# Patient Record
Sex: MALE
[De-identification: names, ages, dates, MRNs, and addresses within clinical notes are randomized per-mention and may not be internally consistent; named-entity substitution may affect disease eponyms.]

---

## 2019-02-14 ENCOUNTER — HOSPITAL ENCOUNTER (OUTPATIENT)
Dept: HOSPITAL 88 - OR | Age: 54
Discharge: HOME | End: 2019-02-14
Attending: INTERNAL MEDICINE
Payer: COMMERCIAL

## 2019-02-14 VITALS — SYSTOLIC BLOOD PRESSURE: 109 MMHG | DIASTOLIC BLOOD PRESSURE: 77 MMHG

## 2019-02-14 DIAGNOSIS — K64.8: ICD-10-CM

## 2019-02-14 DIAGNOSIS — Z12.11: Primary | ICD-10-CM

## 2019-02-14 DIAGNOSIS — E66.3: ICD-10-CM

## 2019-02-14 DIAGNOSIS — K57.30: ICD-10-CM

## 2019-02-14 DIAGNOSIS — F17.210: ICD-10-CM

## 2019-02-14 DIAGNOSIS — Z01.810: ICD-10-CM

## 2019-02-14 DIAGNOSIS — Z71.3: ICD-10-CM

## 2019-02-14 PROCEDURE — 93005 ELECTROCARDIOGRAM TRACING: CPT

## 2019-02-14 PROCEDURE — 45378 DIAGNOSTIC COLONOSCOPY: CPT

## 2019-02-14 NOTE — XMS REPORT
Clinical Summary

                             Created on: 2019



Gianfranco Monahan

External Reference #: [id number]

: 1965

Sex: Male



Demographics







                          Address                   [address] [place] [address]  [address]

 

                          Home Phone                [phone]

 

                          Preferred Language        Unknown

 

                          Marital Status            

 

                          [employer] Affiliation     Unknown

 

                          Race                      White

 

                          Ethnic Group              /Latin





Author







                          Author                    [facility]

 

                          Organization              [facility]

 

                          Address                   Unknown

 

                          Phone                     Unavailable







Support







                Name            Relationship    Address         Phone

 

                TABOADA,LEVI    ECON            Unknown         [phone]







Care Team Providers







                    Care Team Member Name    Role                Phone

 

                    Asked, No Pcp       PCP                 Unavailable







Allergies

No Known Allergies



Medications







                          End Date                  Status



              Medication     Sig          Dispensed     Refills      Start  



                                         Date  

 

                                                    Active



                     simvastatin (ZOCOR) 40 MG       1                     



                           tablet                    9  







Active Problems







 



                           Problem                   Noted Date

 

 



                           Internal derangement of right knee     2019

 

 



                           Effusion of right knee     2019







Encounters







                          Care Team                 Description



                     Date                Type                Specialty  

 

                                        



Renzo Cote Jr., MD           Internal derangement of right knee (Primary Dx); 

Effusion of right knee



                     2019          Office Visit        Orthopedic Surgery  

 

                                        



Lew Diane MA                         Right knee pain, unspecified chronicity (Primary Dx)



                     2019          Orders Only         Orthopedic Surgery  

 

                                        



Lew Diane MA                         Right knee pain, unspecified chronicity (Primary Dx)



                     2019          Orders Only         Sports Medicine  



after 2018



Family History







   



                 Medical History     Relation        Name            Comments

 

   



                           No Known Problems         Brother  

 

   



                           No Known Problems         Father  

 

   



                           No Known Problems         Mother  

 

   



                           No Known Problems         Sister  









   



                 Relation        Name            Status          Comments

 

   



                                         Brother   

 

   



                                         Father   

 

   



                                         Mother   

 

   



                                         Sister   







Social History







                                        Date



                 Tobacco Use     Types           Packs/Day       Years Used 

 

                                         



                                         Never Smoker    

 

    



                                         Smokeless Tobacco: Never   



                                         Used   









   



                 Alcohol Use     Drinks/Week     oz/Week         Comments

 

   



                                         No   









  



                     Alcohol Habits      Answer              Date Recorded

 

  



                     How often do you have a drink containing alcohol?     Never               2019

 

  



                           How many drinks containing alcohol do you have on     Not asked 



                                         a typical day when you are drinking?  

 

  



                           How often do you have six or more drinks on one     Not asked 



                                         occasion?  









 



                           Sex Assigned at Birth     Date Recorded

 

 



                                         Not on file 









                                        Industry



                           Job Start Date            Occupation 

 

                                        Not on file



                           Not on file               Not on file 









                                        Travel End



                           Travel History            Travel Start 

 





                                         No recent travel history available.







Last Filed Vital Signs







                                        Time Taken



                           Vital Sign                Reading 

 

                                        2019  3:26 PM CST



                           Blood Pressure            118/77 

 

                                        2019  3:26 PM CST



                           Pulse                     63 

 

                                        -



                           Temperature               - 

 

                                        -



                           Respiratory Rate          - 

 

                                        -



                           Oxygen Saturation         - 

 

                                        -



                           Inhaled Oxygen            - 



                                         Concentration  

 

                                        2019  3:26 PM CST



                           Weight                    79.4 kg (175 lb) 

 

                                        2019  3:26 PM CST



                           Height                    165.1 cm (5' 5") 

 

                                        2019  3:26 PM CST



                           Body Mass Index           29.12 







Plan of Treatment







                          Care Team                 Description



                     Date                Type                Specialty  

 

                                        



Renzo Cote Jr., MD



 [facility]



[address]



[place] [address]



[phone] (Fax)                       



                     2019          Appointment         Radiology  









   



                 Health Maintenance     Due Date        Last Done       Comments

 

   



                           COLON CANCER SCREENING     2015  

 

   



                           SHINGLES VACCINES (1 of     2015  



                                         2)   

 

   



                     INFLUENZA VACCINE     2018 







Procedures







                                        Comments



                 Procedure Name     Priority        Date/Time       Associated Diagnosis 

 

                                        



Results for this procedure are in the results section.



                 XR KNEE 4+ VW RIGHT     Routine         2019      Right knee pain, 



                           3:38 PM CST               unspecified chronicity 



after 2018



Results

* XR Knee 4+ Vw Right (2019  3:38 PM CST)





 



                           Impressions               Performed At

 

 



                           Effusion knee.Minimal joint narrowing and minimal osteophytes noted.      [facility]











 



                           Narrative                 Performed At

 

 



                           This result has an attachment that is not available.      [facility]



                                         CLINICAL: knee pain and swelling 



                                         FINDINGS: AP, lateral, tunnel, and sunrise views were obtained of the 



                                         right knee. The bones are intact and normally located. There is a moderate 



                                         effusion. The bones, joints, and soft tissues appear normal except for 



                                         mild arthritic changes noted in the compartments. 









   



                 Performing Organization     Address         City/State/Zipcode     Phone Number

 

   



                      [facility]          [address] [place] [address] 





after 2018



Insurance







     



            Payer      Benefit     Subscriber ID     Type       Phone      Address



                                         Plan /    



                                         Group    

 

     



                 [employer]BS            BCBS            xxxxxxxxxxxx     PPO  



                                         CHOICE    



                                         PPO/[employer] PPO    









     



            Guarantor Name     Account     Relation to     Date of     Phone      Billing Address



                     Type                Patient             Birth  

 

     



            Gianfranco Monahan     Personal/F     Self       1965     [phone]     [address] [facility]               ([place])              [place] [address]







Advance Directives





Patient has advance care planning documents on file. For more information, tonia trinh contact:



[facility]ro



[address] [place] [address]